# Patient Record
Sex: MALE | Race: WHITE | NOT HISPANIC OR LATINO
[De-identification: names, ages, dates, MRNs, and addresses within clinical notes are randomized per-mention and may not be internally consistent; named-entity substitution may affect disease eponyms.]

---

## 2024-05-19 ENCOUNTER — HEALTH MAINTENANCE LETTER (OUTPATIENT)
Age: 34
End: 2024-05-19

## 2024-06-01 SDOH — HEALTH STABILITY: PHYSICAL HEALTH: ON AVERAGE, HOW MANY DAYS PER WEEK DO YOU ENGAGE IN MODERATE TO STRENUOUS EXERCISE (LIKE A BRISK WALK)?: 3 DAYS

## 2024-06-01 ASSESSMENT — SOCIAL DETERMINANTS OF HEALTH (SDOH): HOW OFTEN DO YOU GET TOGETHER WITH FRIENDS OR RELATIVES?: TWICE A WEEK

## 2024-06-04 ENCOUNTER — OFFICE VISIT (OUTPATIENT)
Dept: FAMILY MEDICINE | Facility: CLINIC | Age: 34
End: 2024-06-04
Payer: COMMERCIAL

## 2024-06-04 VITALS
BODY MASS INDEX: 29.84 KG/M2 | WEIGHT: 232.5 LBS | HEART RATE: 72 BPM | TEMPERATURE: 98.2 F | DIASTOLIC BLOOD PRESSURE: 73 MMHG | HEIGHT: 74 IN | OXYGEN SATURATION: 96 % | RESPIRATION RATE: 16 BRPM | SYSTOLIC BLOOD PRESSURE: 113 MMHG

## 2024-06-04 DIAGNOSIS — Z13.220 SCREENING FOR HYPERLIPIDEMIA: ICD-10-CM

## 2024-06-04 DIAGNOSIS — Z11.59 ENCOUNTER FOR HEPATITIS C SCREENING TEST FOR LOW RISK PATIENT: ICD-10-CM

## 2024-06-04 DIAGNOSIS — J30.2 SEASONAL ALLERGIC RHINITIS, UNSPECIFIED TRIGGER: ICD-10-CM

## 2024-06-04 DIAGNOSIS — Z13.1 SCREENING FOR DIABETES MELLITUS (DM): ICD-10-CM

## 2024-06-04 DIAGNOSIS — Z11.4 SCREENING FOR HIV (HUMAN IMMUNODEFICIENCY VIRUS): ICD-10-CM

## 2024-06-04 DIAGNOSIS — Z00.00 ROUTINE GENERAL MEDICAL EXAMINATION AT A HEALTH CARE FACILITY: Primary | ICD-10-CM

## 2024-06-04 DIAGNOSIS — Z23 NEED FOR VACCINATION: ICD-10-CM

## 2024-06-04 LAB — HBA1C MFR BLD: 5.5 % (ref 0–5.6)

## 2024-06-04 PROCEDURE — 86803 HEPATITIS C AB TEST: CPT | Performed by: FAMILY MEDICINE

## 2024-06-04 PROCEDURE — 80061 LIPID PANEL: CPT | Performed by: FAMILY MEDICINE

## 2024-06-04 PROCEDURE — 87389 HIV-1 AG W/HIV-1&-2 AB AG IA: CPT | Performed by: FAMILY MEDICINE

## 2024-06-04 RX ORDER — MULTIVITAMIN,THERAPEUTIC
1 TABLET ORAL DAILY
COMMUNITY

## 2024-06-04 RX ORDER — LORATADINE 10 MG/1
10 TABLET, ORALLY DISINTEGRATING ORAL DAILY
COMMUNITY

## 2024-06-04 NOTE — NURSING NOTE
"33 year old  Chief Complaint   Patient presents with    Physical     Notes some weight gain -- wanting to check in. Hasn't had a physical in a few years.    Establish Care     Was living in [facility], recently moved back. [facility].       Blood pressure 113/73, pulse 72, temperature 98.2  F (36.8  C), temperature source Temporal, resp. rate 16, height 1.872 m (6' 1.7\"), weight 105.5 kg (232 lb 8 oz), SpO2 96%. Body mass index is 30.09 kg/m .  There is no problem list on file for this patient.      Wt Readings from Last 2 Encounters:   06/04/24 105.5 kg (232 lb 8 oz)     BP Readings from Last 3 Encounters:   06/04/24 113/73         No current outpatient medications on file.     No current facility-administered medications for this visit.       Social History     Tobacco Use    Smoking status: Never    Smokeless tobacco: Never   Substance Use Topics    Alcohol use: Yes    Drug use: Never       There are no preventive care reminders to display for this patient.    No results found for: \"PAP\"      June 4, 2024 8:38 AM    Prior to immunization administration, verified patients identity using patient s name and date of birth. Please see Immunization Activity for additional information.     Screening Questionnaire for Adult Immunization    Are you sick today?   No   Do you have allergies to medications, food, a vaccine component or latex?   No   Have you ever had a serious reaction after receiving a vaccination?   No   Do you have a long-term health problem with heart, lung, kidney, or metabolic disease (e.g., diabetes), asthma, a blood disorder, no spleen, complement component deficiency, a cochlear implant, or a spinal fluid leak?  Are you on long-term aspirin therapy?   No   Do you have cancer, leukemia, HIV/AIDS, or any other immune system problem?   No   Do you have a parent, brother, or sister with an immune system problem?   No   In the past 3 months, have you taken medications that affect  your immune " system, such as prednisone, other steroids, or anticancer drugs; drugs for the treatment of rheumatoid arthritis, Crohn s disease, or psoriasis; or have you had radiation treatments?   No   Have you had a seizure, or a brain or other nervous system problem?   No   During the past year, have you received a transfusion of blood or blood    products, or been given immune (gamma) globulin or antiviral drug?   No   For women: Are you pregnant or is there a chance you could become       pregnant during the next month?   No   Have you received any vaccinations in the past 4 weeks?   No     Immunization questionnaire answers were all negative.      Patient instructed to remain in clinic for 15 minutes afterwards, and to report any adverse reactions.     Screening performed by Telma Duarte LPN on 6/4/2024 at 9:20 AM.

## 2024-06-04 NOTE — PROGRESS NOTES
Preventive Care Visit  [facility]  Desi Nance MD, Family Medicine  Jun 4, 2024      Assessment & Plan     Nam was seen today for physical and establish care.    Diagnoses and all orders for this visit:    Routine general medical examination at a health care facility    Seasonal allergic rhinitis, unspecified trigger    Screening for hyperlipidemia  -     Lipid Panel ([place]); Future    Screening for diabetes mellitus (DM)  -     Hemoglobin A1c; Future    Encounter for hepatitis C screening test for low risk patient  -     Hepatitis C Screen Reflex to HCV RNA Quant and Genotype; Future    Screening for HIV (human immunodeficiency virus)  -     HIV Antigen Antibody Combo; Future    Need for vaccination  -     TDAP VACCINE (Adacel, Boostrix)  [[id number]]  -     COVID-19 12+ (2023-24) (MODERNA)        Counseling  Appropriate preventive services were discussed with this patient, including applicable screening as appropriate for fall prevention, nutrition, physical activity, Tobacco-use cessation, weight loss and cognition.  Checklist reviewing preventive services available has been given to the patient.  Reviewed patient's diet, addressing concerns and/or questions.   He is at risk for lack of exercise and has been provided with information to increase physical activity for the benefit of his well-being.       Return in about 53 weeks (around 6/10/2025) for Annual Wellness Visit.    Subjective   Nam is a 33 year old, presenting for the following:  Physical (Notes some weight gain -- wanting to check in. Hasn't had a physical in a few years.) and Establish Care (Was living in [facility], recently [facility]       Health Care Directive  Patient does not have a Health Care Directive or Living Will: Discussed advance care planning with patient; information given to patient to review.    HPI  Recent dermatologist visit - mole on his head  Dad has a history of skin  cancer    Exercise: running 3 days/week in summer. Occ weight lifting, stretching daily. Hoping to get back into something similar.  Diet: consistent    Weight has been up and down. Between 200-230# over the past few years    Mood - some stress (work). Overall mood is good.       Health Maintenance Due   Topic Date Due    ADVANCE CARE PLANNING  Never done    HIV SCREENING  Never done    HEPATITIS C SCREENING  Never done    HEPATITIS B IMMUNIZATION (1 of 3 - 19+ 3-dose series) Never done    DTAP/TDAP/TD IMMUNIZATION (1 - Tdap) Never done    COVID-19 Vaccine (3 - 2023-24 season) 09/01/2023 6/1/2024   General Health   How would you rate your overall physical health? Good   Feel stress (tense, anxious, or unable to sleep) Not at all         6/1/2024   Nutrition   Three or more servings of calcium each day? Yes   Diet: Regular (no restrictions)   How many servings of fruit and vegetables per day? (!) 0-1   How many sweetened beverages each day? (!) 3         6/1/2024   Exercise   Days per week of moderate/strenous exercise 3 days         6/1/2024   Social Factors   Frequency of gathering with friends or relatives Twice a week   Worry food won't last until get money to buy more No    No   Food not last or not have enough money for food? No    No   Do you have housing?  Yes    Yes   Are you worried about losing your housing? No    No   Lack of transportation? No    No   Unable to get utilities (heat,electricity)? No    No         6/1/2024   Dental   Dentist two times every year? Yes         6/1/2024   TB Screening   Were you born outside of the [place]? No           6/1/2024   Substance Use   Alcohol more than 3/day or more than 7/wk No   Do you use any other substances recreationally? No     Social History     Tobacco Use    Smoking status: Never    Smokeless tobacco: Never   Substance Use Topics    Alcohol use: Yes     Comment: drink every couple weeks    Drug use: Yes     Types: Marijuana     Comment: occ THC  "            6/1/2024   One time HIV Screening   Previous HIV test? I don't know         6/1/2024   STI Screening   New sexual partner(s) since last STI/HIV test? (!) DECLINE         6/1/2024   Contraception/Family Planning   Questions about contraception or family planning No       Reviewed and updated as needed this visit by Provider   Tobacco  Allergies  Meds  Problems  Med Hx  Surg Hx  Fam Hx  Soc   Hx Sexual Activity             Objective    Exam  /73 (BP Location: Left arm, Patient Position: Sitting, Cuff Size: Adult Large)   Pulse 72   Temp 98.2  F (36.8  C) (Temporal)   Resp 16   Ht 1.872 m (6' 1.7\")   Wt 105.5 kg (232 lb 8 oz)   SpO2 96%   BMI 30.09 kg/m     Estimated body mass index is 30.09 kg/m  as calculated from the following:    Height as of this encounter: 1.872 m (6' 1.7\").    Weight as of this encounter: 105.5 kg (232 lb 8 oz).    Physical Exam  GENERAL: alert and no distress  EYES: Eyes grossly normal to inspection, PERRL and conjunctivae and sclerae normal  HENT: ear canals and TM's normal, nose and mouth without ulcers or lesions  NECK: no adenopathy, no asymmetry, masses, or scars  RESP: lungs clear to auscultation - no rales, rhonchi or wheezes  CV: regular rate and rhythm, normal S1 S2, no S3 or S4, no murmur, click or rub, no peripheral edema  ABDOMEN: soft, nontender, no hepatosplenomegaly, no masses and bowel sounds normal  MS: no gross musculoskeletal defects noted, no edema  SKIN: no suspicious lesions or rashes  NEURO: Normal strength and tone, mentation intact and speech normal  PSYCH: mentation appears normal, affect normal/bright        Signed Electronically by: Desi Nance MD    "

## 2024-06-04 NOTE — PATIENT INSTRUCTIONS
"Nice to meet you today!  We will draw lab tests today. I will contact you in the next 2-3 business days with the results of these labs.   Preventive Care Advice   This is general advice we often give to help people stay healthy. Your care team may have specific advice just for you. Please talk to your care team about your own preventive care needs.  Lifestyle  Exercise at least 150 minutes each week (30 minutes a day, 5 days a week).  Do muscle strengthening activities 2 days a week. These help control your weight and prevent disease.  No smoking.  Wear sunscreen to prevent skin cancer.  Have your home tested for radon every 2 to 5 years. Radon is a colorless, odorless gas that can harm your lungs. To learn more, go to www.health.[facility].mn.us and search for \"Radon in Homes.\"  Keep guns unloaded and locked up in a safe place like a safe or gun vault, or, use a gun lock and hide the keys. Always lock away bullets separately. To learn more, visit DutyCalculator.[place].gov and search for \"safe gun storage.\"  Nutrition  Eat 5 or more servings of fruits and vegetables each day.  Try wheat bread, brown rice and whole grain pasta (instead of white bread, rice, and pasta).  Get enough calcium and vitamin D. Check the label on foods and aim for 100% of the RDA (recommended daily allowance).  Regular exams  Have a dental exam and cleaning every 6 months.  See your health care team every year to talk about:  Any changes in your health.  Any medicines your care team has prescribed.  Preventive care, family planning, and ways to prevent chronic diseases.  Shots (vaccines)   HPV shots (up to age 26), if you've never had them before.  Hepatitis B shots (up to age 59), if you've never had them before.  COVID-19 shot: Get this shot when it's due.  Flu shot: Get a flu shot every year.  Tetanus shot: Get a tetanus shot every 10 years.  Pneumococcal, hepatitis A, and RSV shots: Ask your care team if you need these based on your risk.  Shingles shot " (for age 50 and up).  General health tests  Diabetes screening:  Starting at age 35, Get screened for diabetes at least every 3 years.  If you are younger than age 35, ask your care team if you should be screened for diabetes.  Cholesterol test: At age 39, start having a cholesterol test every 5 years, or more often if advised.  Bone density scan (DEXA): At age 50, ask your care team if you should have this scan for osteoporosis (brittle bones).  Hepatitis C: Get tested at least once in your life.  Abdominal aortic aneurysm screening: Talk to your doctor about having this screening if you:  Have ever smoked; and  Are biologically male; and  Are between the ages of 65 and 75.  STIs (sexually transmitted infections)  Before age 24: Ask your care team if you should be screened for STIs.  After age 24: Get screened for STIs if you're at risk. You are at risk for STIs (including HIV) if:  You are sexually active with more than one person.  You don't use condoms every time.  You or a partner was diagnosed with a sexually transmitted infection.  If you are at risk for HIV, ask about PrEP medicine to prevent HIV.  Get tested for HIV at least once in your life, whether you are at risk for HIV or not.  Cancer screening tests  Cervical cancer screening: If you have a cervix, begin getting regular cervical cancer screening tests at age 21. Most people who have regular screenings with normal results can stop after age 65. Talk about this with your provider.  Breast cancer scan (mammogram): If you've ever had breasts, begin having regular mammograms starting at age 40. This is a scan to check for breast cancer.  Colon cancer screening: It is important to start screening for colon cancer at age 45.  Have a colonoscopy test every 10 years (or more often if you're at risk) Or, ask your provider about stool tests like a FIT test every year or Cologuard test every 3 years.  To learn more about your testing options, visit:  www.[employer]/146411.pdf.  For help making a decision, visit: raffi.ericka/lj17061.  Prostate cancer screening test: If you have a prostate and are age 55 to 69, ask your provider if you would benefit from a yearly prostate cancer screening test.  Lung cancer screening: If you are a current or former smoker age 50 to 80, ask your care team if ongoing lung cancer screenings are right for you.  For informational purposes only. Not to replace the advice of your health care provider. Copyright   2023 [facility]. All rights reserved. Clinically reviewed by the [facility]itions Program. Carezone.com 847332 - REV 04/24.

## 2024-06-05 LAB
CHOLEST SERPL-MCNC: 212 MG/DL
FASTING STATUS PATIENT QL REPORTED: NO
HCV AB SERPL QL IA: NONREACTIVE
HDLC SERPL-MCNC: 49 MG/DL
HIV 1+2 AB+HIV1 P24 AG SERPL QL IA: NONREACTIVE
LDLC SERPL CALC-MCNC: 139 MG/DL
NONHDLC SERPL-MCNC: 163 MG/DL
TRIGL SERPL-MCNC: 122 MG/DL

## 2025-01-16 ENCOUNTER — TRANSFERRED RECORDS (OUTPATIENT)
Dept: HEALTH INFORMATION MANAGEMENT | Facility: CLINIC | Age: 35
End: 2025-01-16
Payer: COMMERCIAL

## 2025-01-20 ENCOUNTER — OFFICE VISIT (OUTPATIENT)
Dept: FAMILY MEDICINE | Facility: CLINIC | Age: 35
End: 2025-01-20
Payer: COMMERCIAL

## 2025-01-20 VITALS
RESPIRATION RATE: 17 BRPM | TEMPERATURE: 96.5 F | BODY MASS INDEX: 29.87 KG/M2 | DIASTOLIC BLOOD PRESSURE: 84 MMHG | WEIGHT: 230.75 LBS | OXYGEN SATURATION: 95 % | SYSTOLIC BLOOD PRESSURE: 127 MMHG | HEART RATE: 93 BPM

## 2025-01-20 DIAGNOSIS — S53.102A: Primary | ICD-10-CM

## 2025-01-20 NOTE — PROGRESS NOTES
ASSESSMENT and PLAN:     LEFT elbow trauma with description of possible acute ligament rupture. Exam is very limited by pain and swelling. Has limited ROM. Had X-rays taken that were reported as negative in official radiologist interpretation   Sent for MRI of elbow. Please call [phone] or [phone] to schedule imaging tests at the [facility].     Also, referred to Orthopedic surgical specialists  Informed that I would be out of town for the next 2 weeks, but he can contact us if needed        Saturnino Whittington MD  Family Medicine and Sports Medicine  [facility]      Medical assistant intake:  Nam Villanueva is a 34 year old male who presents to [facility] today for Musculoskeletal Problem (Fell on L elbow x4 days, fell on icy ground. [place] his elbow pop. Unstable since, went to ER, imagine done. Given Toradol injection. Declined futher pain meds. Using ibuprofen and tylenol as needed. ROM is off. Wearing a sling today. )      SUBJECTIVE:   This is my first time meeting Nam. He's a 35 yo who slipped on the ice about 4 days ago and felt a pop and movement in the LEFT elbow. Went to E.R. in [place]. Had X-rays that were negative by report brought in.   Given a shot of Toradol and a sling. Has been in the sling since.     Review Of Systems:   He had some pain in his hand but that has resolved and he now has full range of motion of his hand and wrist.  Has otherwise been in usual state of health, e.g.   Cardiovascular: negative  Respiratory: No shortness of breath, dyspnea on exertion, cough, or hemoptysis  Gastrointestinal: negative  Genitourinary: negative    Problem list per EMR:  Patient Active Problem List   Diagnosis    Seasonal allergic rhinitis       Current Outpatient Medications   Medication Sig Dispense Refill    loratadine (CLARITIN REDITABS) 10 MG ODT Take 10 mg by mouth daily      multivitamin, therapeutic (THERA-VIT) TABS tablet Take 1 tablet by  mouth daily         No Known Allergies     Social:     Originally from the [facility] area.  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    OBJECTIVE    Vitals: /84 (BP Location: Right arm, Patient Position: Sitting, Cuff Size: Adult Large)   Pulse 93   Temp (!) 96.5  F (35.8  C) (Temporal)   Resp 17   Wt 104.7 kg (230 lb 12 oz)   SpO2 95%   BMI 29.87 kg/m    BMI= Body mass index is 29.87 kg/m .    He appears well other than his left elbow which was in a sling.  We remove the sling and he has a significant amount of ecchymosis over the medial aspect of the elbow as seen in the photo below.  He lacks about 30 degrees of full extension and it is too painful for him to try.  He can flex to about 95 degrees prior to feeling tightness on the medial aspect of the elbow near the ulnar collateral ligament.    He brought in a CD of his x-ray but we do not have a CD reader here.  I cannot view the x-rays myself but I took a photograph of the radiologist interpretation which is seen below.  No fractures noted.        SEE TOP OF NOTE FOR ASSESSMENT AND PLAN    --Saturnino Whittington MD  [facility], Department of Family Medicine and Community Health

## 2025-01-20 NOTE — NURSING NOTE
"34 year old  Chief Complaint   Patient presents with    Musculoskeletal Problem     Fell on L elbow x4 days, fell on icy ground. [place] his elbow pop. Unstable since, went to ER, imagine done. Given Toradol injection. Declined futher pain meds. Using ibuprofen and tylenol as needed. ROM is off. Wearing a sling today.        Blood pressure 127/84, pulse 93, temperature (!) 96.5  F (35.8  C), temperature source Temporal, resp. rate 17, weight 104.7 kg (230 lb 12 oz), SpO2 95%. Body mass index is 29.87 kg/m .  Patient Active Problem List   Diagnosis    Seasonal allergic rhinitis       Wt Readings from Last 2 Encounters:   01/20/25 104.7 kg (230 lb 12 oz)   06/04/24 105.5 kg (232 lb 8 oz)     BP Readings from Last 3 Encounters:   01/20/25 127/84   06/04/24 113/73         Current Outpatient Medications   Medication Sig Dispense Refill    loratadine (CLARITIN REDITABS) 10 MG ODT Take 10 mg by mouth daily      multivitamin, therapeutic (THERA-VIT) TABS tablet Take 1 tablet by mouth daily       No current facility-administered medications for this visit.       Social History     Tobacco Use    Smoking status: Never    Smokeless tobacco: Never   Substance Use Topics    Alcohol use: Yes     Comment: drink every couple weeks    Drug use: Yes     Types: Marijuana     Comment: occ THC       Health Maintenance Due   Topic Date Due    GLUCOSE  Never done    HEPATITIS B IMMUNIZATION (1 of 3 - 19+ 3-dose series) Never done    INFLUENZA VACCINE (1) 09/01/2024    COVID-19 Vaccine (4 - 2024-25 season) 09/01/2024    PHQ-2 (once per calendar year)  01/01/2025       No results found for: \"PAP\"      January 20, 2025 11:31 AM    "

## 2025-01-20 NOTE — PATIENT INSTRUCTIONS
ASSESSMENT and PLAN:     LEFT elbow trauma with description of possible acute ligament rupture. Exam is very limited by pain and swelling. Has limited ROM. Had X-rays taken that were reported as negative in official radiologist interpretation   Sent for MRI of elbow. Please call [phone] or [phone] to schedule imaging tests at the [facility].     Also, referred to Orthopedic surgical specialists  Informed that I would be out of town for the next 2 weeks, but he can contact us if needed        Saturnino Whittington MD  Family Medicine and Sports Medicine  [facility]

## 2025-01-21 ENCOUNTER — PATIENT OUTREACH (OUTPATIENT)
Dept: CARE COORDINATION | Facility: CLINIC | Age: 35
End: 2025-01-21
Payer: COMMERCIAL

## 2025-02-07 ENCOUNTER — HOSPITAL ENCOUNTER (OUTPATIENT)
Dept: MRI IMAGING | Facility: CLINIC | Age: 35
Discharge: HOME | End: 2025-02-07
Attending: FAMILY MEDICINE | Admitting: FAMILY MEDICINE
Payer: COMMERCIAL

## 2025-02-07 DIAGNOSIS — S53.102A: ICD-10-CM

## 2025-02-07 PROCEDURE — 73221 MRI JOINT UPR EXTREM W/O DYE: CPT | Mod: 26 | Performed by: RADIOLOGY

## 2025-02-07 PROCEDURE — 73221 MRI JOINT UPR EXTREM W/O DYE: CPT | Mod: LT

## 2025-02-10 ENCOUNTER — OFFICE VISIT (OUTPATIENT)
Dept: ORTHOPEDICS | Facility: CLINIC | Age: 35
End: 2025-02-10
Attending: FAMILY MEDICINE
Payer: COMMERCIAL

## 2025-02-10 ENCOUNTER — ANCILLARY PROCEDURE (OUTPATIENT)
Dept: GENERAL RADIOLOGY | Facility: CLINIC | Age: 35
End: 2025-02-10
Attending: STUDENT IN AN ORGANIZED HEALTH CARE EDUCATION/TRAINING PROGRAM
Payer: COMMERCIAL

## 2025-02-10 DIAGNOSIS — M25.522 ELBOW PAIN, LEFT: ICD-10-CM

## 2025-02-10 DIAGNOSIS — S53.102A: ICD-10-CM

## 2025-02-10 PROCEDURE — 73080 X-RAY EXAM OF ELBOW: CPT | Mod: LT | Performed by: RADIOLOGY

## 2025-02-10 PROCEDURE — 99203 OFFICE O/P NEW LOW 30 MIN: CPT | Performed by: STUDENT IN AN ORGANIZED HEALTH CARE EDUCATION/TRAINING PROGRAM

## 2025-02-10 NOTE — NURSING NOTE
Reason For Visit:   Chief Complaint   Patient presents with    Consult     Closed traumatic subluxation of left elbow  DOI: 1/16/25       Primary MD: Desi Nance  Ref. MD: Saturnino Whittington    Age: 34 year old    ?  No      There were no vitals taken for this visit.      Pain Assessment  Patient Currently in Pain: Yes  0-10 Pain Scale: 2  Primary Pain Location: Elbow (left)  Pain Descriptors: Intermittent, Discomfort, Other (comment) (feels unstable)    Hand Dominance Evaluation  Hand Dominance: Right          QuickDASH Assessment       No data to display                   Current Outpatient Medications   Medication Sig Dispense Refill    loratadine (CLARITIN REDITABS) 10 MG ODT Take 10 mg by mouth daily      multivitamin, therapeutic (THERA-VIT) TABS tablet Take 1 tablet by mouth daily         No Known Allergies    ADITI VELAZQUEZ, ATC

## 2025-02-10 NOTE — PROGRESS NOTES
Orthopa[facility] H&P Note:  Date: Feb 10, 2025     Patient Name: Nam Villanueva  MRN: [id number]    Consult requested by: Saturnino Whittington    CHIEF COMPLAINT: Left elbow subluxation/coronoid tip fx    Dominant Hand: Right  Occupation:       HPI:  Mr. Nam Villanueva is a 34 year old  male right hand dominant who presents with left elbow injury. DOI 1/16/25. [place]. Braced himself with his left arm and felt a pop in the elbow and the arm go limp. Had imaging done at that time. Used a sling first 2 weeks, then weaned it over last week. Main issue is lack of terminal extension. Some soreness, states elbow feels a little loose. No numbness/tingling. Has not had any treatment.      PAST MEDICAL HISTORY:  No past medical history on file.    PAST SURGICAL HISTORY:  Past Surgical History:   Procedure Laterality Date    NO PAST SURGERIES         MEDICATIONS:  Current Outpatient Medications   Medication Sig Dispense Refill    loratadine (CLARITIN REDITABS) 10 MG ODT Take 10 mg by mouth daily      multivitamin, therapeutic (THERA-VIT) TABS tablet Take 1 tablet by mouth daily       No current facility-administered medications for this visit.       ALLERGIES:   No Known Allergies    FAMILY HISTORY:  No pertinent family history    SOCIAL HISTORY:  Social History     Tobacco Use    Smoking status: Never    Smokeless tobacco: Never   Substance Use Topics    Alcohol use: Yes     Comment: drink every couple weeks    Drug use: Yes     Types: Marijuana     Comment: occ THC       The patient's past medical, family, and social history was reviewed and confirmed.    REVIEW OF SYMPTOMS:      General: Negative   Eyes: Negative   Ear, Nose and Throat: Negative   Respiratory: Negative   Cardiovascular: Negative   Gastrointestinal: Negative   Genito-urinary: Negative   Musculoskeletal: see HPI  Neurological: Negative   Psychological: Negative  HEME:  Negative   ENDO: Negative   SKIN: Negative    VITALS:  There were no vitals filed for this visit.    EXAM:  General: NAD, A&Ox3  HEENT: NC/AT  CV: RRR by peripheral pulse  Pulmonary: Non-labored breathing on RA  LUE:  Range of motion -25 to near full flexion  Full pronation supination  Mild tenderness along medial lateral joint lines  No palpable crepitus  Palpable effusion  Negative pivot shift  Intact EPL, FPL, hand intrinsics  Sensation intact to light touch all distributions  Warm well-perfused, cap refill less than 2 seconds       IMAGING:    X-rays of left elbow today compared to previous demonstrating coronoid tip fracture.  Elbow remains concentric    MRI left elbow 2/7/25  Redemonstrated fracture of the coronoid process with impaction on the trochlea of the distal humerus  Contusion and edema of the radial head  Sprains of the radial collateral, lateral ulnar collateral and ulnar collateral ligaments, overall intact  Soft tissue edema and strains of common extensor and common flexor tendons    I have personally reviewed the above images and labs.         IMPRESSION AND RECOMMENDATIONS:  Mr. Nam Villanueva is a 34 year old male right hand dominant with left elbow injury.    Pattern of injury on imaging consistent with partial subluxation event.  Unlikely the patient completely dislocated the elbow.     Elbow is concentric and stable on exam today.  No surgery is indicated.  I recommended beginning hand therapy for range of motion followed by strengthening.  No immobilization recommended as this will just make patient stiff.    Follow-up with me in 6 weeks for repeat evaluation.  No x-rays needed.    All questions answered.  The patient voiced understanding and agreement.    Bhargav Humphrey MD    [place], Upper Extremity & Microvascular Surgery  Department of Orthopaedic Surgery  [facility]

## 2025-02-10 NOTE — LETTER
2/10/2025      Nam Villanueva  [address]  [facility] [address]      Dear Colleague,    Thank you for referring your patient, Nam Villanueva, to the [facility] [place]. Please see a copy of my visit note below.    Orthopaedic Surgery Hand and Upper Extremity Clinic H&P Note:  Date: Feb 10, 2025     Patient Name: Nam Villanueva  MRN: [id number]    Consult requested by: Saturnino Whittington    CHIEF COMPLAINT: Left elbow subluxation/coronoid tip fx    Dominant Hand: Right  Occupation:       HPI:  Mr. Nam Villanueva is a 34 year old  male right hand dominant who presents with left elbow injury. DOI 1/16/25. [place]. Braced himself with his left arm and felt a pop in the elbow and the arm go limp. Had imaging done at that time. Used a sling first 2 weeks, then weaned it over last week. Main issue is lack of terminal extension. Some soreness, states elbow feels a little loose. No numbness/tingling. Has not had any treatment.      PAST MEDICAL HISTORY:  No past medical history on file.    PAST SURGICAL HISTORY:  Past Surgical History:   Procedure Laterality Date     NO PAST SURGERIES         MEDICATIONS:  Current Outpatient Medications   Medication Sig Dispense Refill     loratadine (CLARITIN REDITABS) 10 MG ODT Take 10 mg by mouth daily       multivitamin, therapeutic (THERA-VIT) TABS tablet Take 1 tablet by mouth daily       No current facility-administered medications for this visit.       ALLERGIES:   No Known Allergies    FAMILY HISTORY:  No pertinent family history    SOCIAL HISTORY:  Social History     Tobacco Use     Smoking status: Never     Smokeless tobacco: Never   Substance Use Topics     Alcohol use: Yes     Comment: drink every couple weeks     Drug use: Yes     Types: Marijuana     Comment: occ THC       The patient's past medical, family, and social history was reviewed and confirmed.    REVIEW OF  SYMPTOMS:      General: Negative   Eyes: Negative   Ear, Nose and Throat: Negative   Respiratory: Negative   Cardiovascular: Negative   Gastrointestinal: Negative   Genito-urinary: Negative   Musculoskeletal: see HPI  Neurological: Negative   Psychological: Negative  HEME: Negative   ENDO: Negative   SKIN: Negative    VITALS:  There were no vitals filed for this visit.    EXAM:  General: NAD, A&Ox3  HEENT: NC/AT  CV: RRR by peripheral pulse  Pulmonary: Non-labored breathing on RA  LUE:  Range of motion -25 to near full flexion  Full pronation supination  Mild tenderness along medial lateral joint lines  No palpable crepitus  Palpable effusion  Negative pivot shift  Intact EPL, FPL, hand intrinsics  Sensation intact to light touch all distributions  Warm well-perfused, cap refill less than 2 seconds       IMAGING:    X-rays of left elbow today compared to previous demonstrating coronoid tip fracture.  Elbow remains concentric    MRI left elbow 2/7/25  Redemonstrated fracture of the coronoid process with impaction on the trochlea of the distal humerus  Contusion and edema of the radial head  Sprains of the radial collateral, lateral ulnar collateral and ulnar collateral ligaments, overall intact  Soft tissue edema and strains of common extensor and common flexor tendons    I have personally reviewed the above images and labs.         IMPRESSION AND RECOMMENDATIONS:  Mr. Nam Villanueva is a 34 year old male right hand dominant with left elbow injury.    Pattern of injury on imaging consistent with partial subluxation event.  Unlikely the patient completely dislocated the elbow.     Elbow is concentric and stable on exam today.  No surgery is indicated.  I recommended beginning hand therapy for range of motion followed by strengthening.  No immobilization recommended as this will just make patient stiff.    Follow-up with me in 6 weeks for repeat evaluation.  No x-rays needed.    All questions answered.  The patient  voiced understanding and agreement.    Bhargav Humphrey MD    Hand, Upper Extremity & Microvascular Surgery  Department of Orthopaedic Surgery  [facility]          Again, thank you for allowing me to participate in the care of your patient.        Sincerely,        Bhargav Humphrey MD    Electronically signed

## 2025-02-17 ENCOUNTER — THERAPY VISIT (OUTPATIENT)
Dept: OCCUPATIONAL THERAPY | Facility: CLINIC | Age: 35
End: 2025-02-17
Payer: COMMERCIAL

## 2025-02-17 DIAGNOSIS — S53.102A: ICD-10-CM

## 2025-02-17 PROCEDURE — 97165 OT EVAL LOW COMPLEX 30 MIN: CPT | Mod: GO | Performed by: SPECIALIST/TECHNOLOGIST

## 2025-02-17 PROCEDURE — 97110 THERAPEUTIC EXERCISES: CPT | Mod: GO | Performed by: SPECIALIST/TECHNOLOGIST

## 2025-02-17 NOTE — PROGRESS NOTES
"OCCUPATIONAL THERAPY EVALUATION  Type of Visit: Evaluation              Subjective        Presenting condition or subjective complaint:    Date of onset: 02/10/25    Relevant medical history:     No past medical history on file.    Dates & types of surgery:    Past Surgical History:   Procedure Laterality Date    NO PAST SURGERIES           Prior diagnostic imaging/testing results:       Xray, 2/10: \"Redemonstration volarly displaced coronoid process fracture without  evidence of bony bridging. No substantial joint effusion.     No substantial degenerative change.     Mild subcutaneous soft tissue stranding ulnar aspect of the elbow.\"    MRI, 2/7\"mpression:     Findings suggestive of transient posterior elbow dislocation:  *  Ventrally and radially displaced fracture of the coronoid process.  Impaction fracture of the trochlea of the distal humerus.  *  Contusion of the ventral radial head.  *  High-grade sprains of the radial collateral, lateral ulnar  collateral, and annular ligaments.  *  Low to moderate grade sprain of the ulnar collateral ligament.  *  Strain of the proximal common extensor tendon with partial tearing  of the proximal posterior fibers.  *  Strain of the proximal common flexor tendon without high-grade  tear.\"    Prior therapy history for the same diagnosis, illness or injury:        Per Dr. Humphrey.'s order, \"Left elbow partial dislocation with partial sprains of UCL and LUCL - no tears   Referral for ROM then strengthening \"    Prior Level of Function  Ind with ADL, IADL, work (), and driving    Living Environment  No concerns    Employment:    , full time, lots of phone and computer use  Hobbies/Interests:        Objective   ADDITIONAL HISTORY:  Right hand dominant  Patient reports symptoms of pain, stiffness/loss of motion, weakness/loss of strength, and edema  Transportation: drives  Currently working in normal job without restrictions    Functional Outcome Measure:       " "2/17/2025     4:00 PM   Upper Extremity Functional Index (  1996 PW Srinivas)   a.  Any of your usual work, housework or school activities 3-A Little bit of Difficulty   b.  Your usual hobbies, recreational or sporting activities 2-Moderate Difficulty   c.  Lifting a bag of groceries to waist level 3-A Little bit of Difficulty   d.  Placing an object onto, or removing it from an overhead shelf 2-Moderate Difficulty   e.  Washing your hair or scalp 2-Moderate Difficulty   f.   Pushing up on your hands (e.g., from bathtub or chair) 0-Extreme Difficulty   g.  Preparing food (e.g., peeling, cutting) 4-No Difficulty   h.  Driving 3-A Little bit of Difficulty   I.   Vacuuming, sweeping, or raking 3-A Little bit of Difficulty   j.   Dressing 3-A Little bit of Difficulty   k.  Doing up buttons 3-A Little bit of Difficulty   l.   Using tools or appliances 4-No Difficulty   m. Opening doors 3-A Little bit of Difficulty   n.  Cleaning 3-A Little bit of Difficulty   o.  Tying or lacing shoes 2-Moderate Difficulty   p.  Sleeping 3-A Little bit of Difficulty   q.  Laundering clothes. (e.g., washing, ironing, folding) 3-A Little bit of Difficulty   r.   Opening a jar 1-Quite a bit of Difficulty   s.  Throwing a ball 0-Extreme Difficulty   t.   Carrying a small suitcase with your affected limb  3-A Little bit of Difficulty   Column Totals: /80 50        PAIN:  Pain Level at Rest: 0/10  Pain Level with Use: 4/10  Pain Location: elbow  Pain Quality: quick twinge  Pain Frequency: 3 per week  Pain is Worst: daytime  Pain is Exacerbated By: sudden movements, especially extension  Pain is Relieved By: cold, heat, and rest  Pain Progression: Improved    POSTURE: Normal     EDEMA: Mild, generally \"puffiness at volar elbow with small pocked of swelling just proximal to elbow crease on medial aspect    SENSATION: WNL throughout all nerve distributions; per patient report     SCREENS: Shoulder, wrist, and hand AROM WNL and painfree    ROM: "   Elbow ROM  Left AROM Right AROM    Flexion 133, tight at lateral elbow but no pain 152   Extension -30, pain free 0   Supination Full, pain free  90   Pronation Full, pain free  90       OBSERVATIONS/APPEARANCE: No overt deformity or guarding, no evidence of or reports of laxity with AROM today    RESISTED TESTING: DNT    STRENGTH: DNT    PALPATION: Non tender to medial or lateral joint lines, distal biceps, olecranon, radial head        Assessment & Plan   CLINICAL IMPRESSIONS  Medical Diagnosis: Closed traumatic subluxation of left elbow, initial encounter (S53.102A)    Treatment Diagnosis: L elbow pain, stiffness    Impression/Assessment: Pt is a 34 year old male presenting to Occupational Therapy due to L elbow pain and stiffness.  The following significant findings have been identified: Impaired activity tolerance, Impaired coordination, Impaired ROM, Impaired strength, and Pain.  These identified deficits interfere with their ability to perform self care tasks, work tasks, recreational activities, and driving  as compared to previous level of function.     Clinical Decision Making (Complexity):  Assessment of Occupational Performance: 5 or more Performance Deficits  Occupational Performance Limitations: bathing/showering, dressing, hygiene and grooming, driving and community mobility, home establishment and management, shopping, sleep, play, and leisure activities  Clinical Decision Making (Complexity): Low complexity    PLAN OF CARE  Treatment Interventions:  Modalities:  As indicated  Therapeutic Exercise:  AROM, AAROM, PROM, Isotonics, Isometrics, and Stabilization  Neuromuscular re-education:  Isometrics and Stabilization  Manual Techniques:  Myofascial release and Manual edema mobilization  Orthotic Fabrication:  Static and as indicated  Self Care:  Self Care Tasks and Work Tasks    Long Term Goals   OT Goal 1  Goal Identifier: IADLS  Goal Description: Nam will demonstrate improved elbow extension  to allow for pain free pushinging up from surfaces for IADLs and functional mobility  Rationale: In order to maximize safety and independence with ADL/IADLs  Target Date: 04/28/25      Frequency of Treatment: 2x/month  Duration of Treatment: 10 weeks     Recommended Referrals to Other Professionals:  NA  Education Assessment: Learner/Method: Patient;No Barriers to Learning     Risks and benefits of evaluation/treatment have been explained.   Patient/Family/caregiver agrees with Plan of Care.     Evaluation Time:    OT Eval, Low Complexity Minutes (99478): 20       Signing Clinician: ABHINAV PICKETT

## 2025-03-03 ENCOUNTER — THERAPY VISIT (OUTPATIENT)
Dept: OCCUPATIONAL THERAPY | Facility: CLINIC | Age: 35
End: 2025-03-03
Payer: COMMERCIAL

## 2025-03-03 DIAGNOSIS — M25.522 LEFT ELBOW PAIN: Primary | ICD-10-CM

## 2025-03-03 PROCEDURE — 97110 THERAPEUTIC EXERCISES: CPT | Mod: GO | Performed by: OCCUPATIONAL THERAPIST

## 2025-03-17 ENCOUNTER — THERAPY VISIT (OUTPATIENT)
Dept: OCCUPATIONAL THERAPY | Facility: CLINIC | Age: 35
End: 2025-03-17
Payer: COMMERCIAL

## 2025-03-17 DIAGNOSIS — M25.522 LEFT ELBOW PAIN: Primary | ICD-10-CM

## 2025-03-17 PROCEDURE — 97110 THERAPEUTIC EXERCISES: CPT | Mod: GO | Performed by: SPECIALIST/TECHNOLOGIST

## 2025-03-31 ENCOUNTER — OFFICE VISIT (OUTPATIENT)
Dept: ORTHOPEDICS | Facility: CLINIC | Age: 35
End: 2025-03-31
Payer: COMMERCIAL

## 2025-03-31 DIAGNOSIS — S53.102D: Primary | ICD-10-CM

## 2025-03-31 PROCEDURE — 99213 OFFICE O/P EST LOW 20 MIN: CPT | Performed by: STUDENT IN AN ORGANIZED HEALTH CARE EDUCATION/TRAINING PROGRAM

## 2025-03-31 NOTE — PROGRESS NOTES
"Orthopaedic Surgery Hand and Upper Extremity Clinic Progress note:  Date: Mar 31, 2025     Patient Name: Nam Villanueva  MRN: [id number]    Consult requested by: Saturnino Whittington    CHIEF COMPLAINT: Left elbow subluxation/coronoid tip fx    Dominant Hand: Right  Occupation:     Interval 3/31/25: Patient returns today and is doing well.  Elbow range of motion has improved.  He is participating in OT.  He has no pain.  He did state that he planted on his bed with arm extended the other day and the elbow felt a little \"weird\"\" but did not experience pain. Also some mild stiffness if left flexed for a prolonged time.    HPI:  Mr. Nam Villanueva is a 34 year old  male right hand dominant who presents with left elbow injury. DOI 1/16/25. [place] Braced himself with his left arm and felt a pop in the elbow and the arm go limp. Had imaging done at that time. Used a sling first 2 weeks, then weaned it over last week. Main issue is lack of terminal extension. Some soreness, states elbow feels a little loose. No numbness/tingling. Has not had any treatment.    VITALS:  There were no vitals filed for this visit.    EXAM:  General: NAD, A&Ox3  HEENT: NC/AT  CV: RRR by peripheral pulse  Pulmonary: Non-labored breathing on RA  LUE:  Range of motion 0 to full flexion, can hyperextend on contralateral  Full pronation supination  No tenderness along joint lines  No palpable crepitus  Very mild effusion  Negative pivot shift, negative milking maneuver, negative pushoff test  Intact EPL, FPL, hand intrinsics  Sensation intact to light touch all distributions  Warm well-perfused, cap refill less than 2 seconds       IMAGING:    X-rays of left elbow today compared to previous demonstrating coronoid tip fracture.  Elbow remains concentric    MRI left elbow 2/7/25  Redemonstrated fracture of the coronoid process with impaction on the trochlea of the distal humerus  Contusion and " edema of the radial head  Sprains of the radial collateral, lateral ulnar collateral and ulnar collateral ligaments, overall intact  Soft tissue edema and strains of common extensor and common flexor tendons    I have personally reviewed the above images and labs.         IMPRESSION AND RECOMMENDATIONS:  Mr. Nam Villanueva is a 34 year old male right hand dominant with left elbow injury.    Pattern of injury on imaging consistent with partial subluxation event.  Unlikely the patient completely dislocated the elbow.     Elbow is concentric and stable on exam today.  Patient is progressing appropriately.  At this point, I have recommended that the patient begin strengthening with OT.  He may progress weightbearing and strengthening as tolerated.     All questions answered.  The patient voiced understanding and agreement.  He will follow-up with me as needed.    Bhargav Humphrey MD    Hand, Upper Extremity & Microvascular Surgery  Department of Orthopaedic Surgery  [facility]

## 2025-03-31 NOTE — NURSING NOTE
Reason For Visit:   Chief Complaint   Patient presents with    RECHECK     Follow-up Closed traumatic subluxation of left elbow  DOI: 1/16/25       Primary MD: Desi Nance  Ref. MD: Ynes    Age: 34 year old    ?  No      There were no vitals taken for this visit.      Pain Assessment  Patient Currently in Pain: No (No regular pain. Occasional discomfort with specific activity.)    Hand Dominance Evaluation  Hand Dominance: Right          QuickDASH Assessment       No data to display                   Current Outpatient Medications   Medication Sig Dispense Refill    loratadine (CLARITIN REDITABS) 10 MG ODT Take 10 mg by mouth daily      multivitamin, therapeutic (THERA-VIT) TABS tablet Take 1 tablet by mouth daily         No Known Allergies    Dannielle Carr, ATC

## 2025-03-31 NOTE — LETTER
"3/31/2025      Nam Villanueva  [address]  [facility] [address]      Dear Colleague,    Thank you for referring your patient, Nam Villanueva, to the [facility] [place]. Please see a copy of my visit note below.    Orthopaedic Surgery Hand and Upper Extremity Clinic Progress note:  Date: Mar 31, 2025     Patient Name: Nam Villanueva  MRN: [id number]    Consult requested by: Saturnino Whittington    CHIEF COMPLAINT: Left elbow subluxation/coronoid tip fx    Dominant Hand: Right  Occupation:     Interval 3/31/25: Patient returns today and is doing well.  Elbow range of motion has improved.  He is participating in OT.  He has no pain.  He did state that he planted on his bed with arm extended the other day and the elbow felt a little \"weird\"\" but did not experience pain. Also some mild stiffness if left flexed for a prolonged time.    HPI:  Mr. Nam Villanueva is a 34 year old  male right hand dominant who presents with left elbow injury. DOI 1/16/25. [place]. Braced himself with his left arm and felt a pop in the elbow and the arm go limp. Had imaging done at that time. Used a sling first 2 weeks, then weaned it over last week. Main issue is lack of terminal extension. Some soreness, states elbow feels a little loose. No numbness/tingling. Has not had any treatment.    VITALS:  There were no vitals filed for this visit.    EXAM:  General: NAD, A&Ox3  HEENT: NC/AT  CV: RRR by peripheral pulse  Pulmonary: Non-labored breathing on RA  LUE:  Range of motion 0 to full flexion, can hyperextend on contralateral  Full pronation supination  No tenderness along joint lines  No palpable crepitus  Very mild effusion  Negative pivot shift, negative milking maneuver, negative pushoff test  Intact EPL, FPL, hand intrinsics  Sensation intact to light touch all distributions  Warm well-perfused, cap refill less than 2 seconds   "     IMAGING:    X-rays of left elbow today compared to previous demonstrating coronoid tip fracture.  Elbow remains concentric    MRI left elbow 2/7/25  Redemonstrated fracture of the coronoid process with impaction on the trochlea of the distal humerus  Contusion and edema of the radial head  Sprains of the radial collateral, lateral ulnar collateral and ulnar collateral ligaments, overall intact  Soft tissue edema and strains of common extensor and common flexor tendons    I have personally reviewed the above images and labs.         IMPRESSION AND RECOMMENDATIONS:  Mr. Nam Villanueva is a 34 year old male right hand dominant with left elbow injury.    Pattern of injury on imaging consistent with partial subluxation event.  Unlikely the patient completely dislocated the elbow.     Elbow is concentric and stable on exam today.  Patient is progressing appropriately.  At this point, I have recommended that the patient begin strengthening with OT.  He may progress weightbearing and strengthening as tolerated.     All questions answered.  The patient voiced understanding and agreement.  He will follow-up with me as needed.    Bhargav Humphrey MD    Hand, Upper Extremity & Microvascular Surgery  Department of Orthopaedic Surgery  [facility]          Again, thank you for allowing me to participate in the care of your patient.        Sincerely,        Bhargav Humphrey MD    Electronically signed

## 2025-04-17 ENCOUNTER — THERAPY VISIT (OUTPATIENT)
Dept: OCCUPATIONAL THERAPY | Facility: CLINIC | Age: 35
End: 2025-04-17
Payer: COMMERCIAL

## 2025-04-17 DIAGNOSIS — M25.522 LEFT ELBOW PAIN: Primary | ICD-10-CM

## 2025-05-02 PROCEDURE — 87651 STREP A DNA AMP PROBE: CPT | Performed by: NURSE PRACTITIONER

## 2025-05-02 PROCEDURE — 99000 SPECIMEN HANDLING OFFICE-LAB: CPT | Performed by: PATHOLOGY

## 2025-05-02 PROCEDURE — 87635 SARS-COV-2 COVID-19 AMP PRB: CPT | Performed by: NURSE PRACTITIONER

## 2025-05-12 ENCOUNTER — THERAPY VISIT (OUTPATIENT)
Dept: OCCUPATIONAL THERAPY | Facility: CLINIC | Age: 35
End: 2025-05-12
Payer: COMMERCIAL

## 2025-05-12 DIAGNOSIS — M25.522 LEFT ELBOW PAIN: Primary | ICD-10-CM

## 2025-05-12 PROCEDURE — 97110 THERAPEUTIC EXERCISES: CPT | Mod: GO | Performed by: OCCUPATIONAL THERAPIST

## 2025-05-13 NOTE — PROGRESS NOTES
Hand Therapy Discharge Note  Please refer to the daily flowsheet for treatment today, total treatment time and time spent performing 1:1 timed codes.          05/12/25 0500   Appointment Info   Treating Provider Luh Woodward MS, OTR/L, CHT   Total/Authorized Visits 6   Visits Used 5   Medical Diagnosis Closed traumatic subluxation of left elbow, initial encounter (S53.102A)   OT Tx Diagnosis L elbow pain, stiffness   Other pertinent information DOI 1/16/25   Progress Note/Certification   Onset of Illness/Injury or Date of Surgery 02/10/25   Therapy Frequency 2x/month   Predicted Duration 10 weeks   Progress Note Due Date 04/28/25   Progress Note Completed Date 02/17/25   OT Goal 1   Goal Identifier IADLS   Goal Description Nam will demonstrate improved elbow extension to allow for pain free pushinging up from surfaces for IADLs and functional mobility   Rationale In order to maximize safety and independence with ADL/IADLs   Target Date 04/28/25   Date Met 05/12/25   Subjective Report   Subjective Report Feeling less of the tired/stiff. Has done some pushups, no restrictions.   Objective Measures   Objective Measures Objective Measure 1;Objective Measure 2;Objective Measure 3   Objective Measure 1   Objective Measure L Elbow Extension (PRE)   Details -4, in neutral, -2 sup, 2 HE pron   Objective Measure 2   Objective Measure  strength   Details R84, 92 L 90, 91   Objective Measure 3   Objective Measure Post measurements   Details Affected elbow extension is at 0 or mild hyperextension after stretching.   OT Modalities   OT Modalities Hot/Cold Packs   Hot/Cold Packs   Treatment Detail Applied hot pack for about 5 minutes total to the left anterior elbow, during extension stretching.   Patient Response/Progress As expected. No concerns noted.   Treatment Interventions (OT)   Interventions Therapeutic Procedure/Exercise   Therapeutic Procedure/Exercise   Therapeutic Procedure: strength, endurance, ROM,  flexibillity minutes (24768) 28   Ther Proc 1 Performed clinical assessment of physical impairments related to function, in order to fully understand current functional limitations and to guide exercises in clinic and home exercise program.   PTRx Ther Proc 1 Supine, used mat table. Therapist assisted the patient to perform low load prolonged stretches into elbow extension, with the forearm in various planes.   PTRx Ther Proc 1 - Details Place a 2 pound cuff weight on the patient's distal forearm, and a wedge roll to the posterior left humerus.   PTRx Ther Proc 2 Patient tolerates about 4 minutes of stretching with no negative reactions, with significant force from cuff weight and/or gravity.   PTRx Ther Proc 2 - Details Performed about 5 reps of 30 to 60 seconds of low load stretching, passive left elbow extension range of motion with therapist assist.   PTRx Ther Proc 3 - Details Patient performs elbow flexion with the shoulder in 90 degrees, supine.  Minimal pain when coming out of low load prolonged stretches.   PTRx Ther Proc 4 - Details Verbally reviewed exercises for the tricep and bicep, things are going well, okay to continue.  Okay to continue episodes this has been going very well.   PTRx Ther Proc 5 - Details Patient performs 3 pound weight at the distal forearm.  Therapist provided verbal and visual cues.  Patient tolerates gentle strengthening very well.  This exercise was chosen to work on scapular control which appears to be somewhat diminished due to the left upper extremity injury.  For home programming, recommend working on any shoulder exercises as desired.   PTRx Ther Proc 7 ok to do pull ups on bar   Skilled Intervention Provided recommendations for home programming going forward, as will be the last visit.   Patient Response/Progress As expected. No concerns noted.   Education   Learner/Method Patient;No Barriers to Learning   Plan   Home program PTRx         Assessment/Plan:  Patient is  progressing well.  Patient is ready to be discharged from therapy and continue their home treatment program.  STG/LTG:  See above for details.  This episode of care was medically necessary because the patient required skilled hand therapy to facilitate return to function at the highest expected level, given their presenting diagnosis.    DISCHARGE  Reason for Discharge:see assessment section

## 2025-07-20 ENCOUNTER — HEALTH MAINTENANCE LETTER (OUTPATIENT)
Age: 35
End: 2025-07-20